# Patient Record
Sex: FEMALE | Race: BLACK OR AFRICAN AMERICAN | NOT HISPANIC OR LATINO | Employment: UNEMPLOYED | URBAN - METROPOLITAN AREA
[De-identification: names, ages, dates, MRNs, and addresses within clinical notes are randomized per-mention and may not be internally consistent; named-entity substitution may affect disease eponyms.]

---

## 2019-12-11 ENCOUNTER — OFFICE VISIT (OUTPATIENT)
Dept: MEDICAL GROUP | Facility: LAB | Age: 54
End: 2019-12-11
Payer: COMMERCIAL

## 2019-12-11 VITALS
TEMPERATURE: 97.3 F | HEIGHT: 67 IN | RESPIRATION RATE: 15 BRPM | HEART RATE: 95 BPM | DIASTOLIC BLOOD PRESSURE: 88 MMHG | SYSTOLIC BLOOD PRESSURE: 148 MMHG | WEIGHT: 207 LBS | BODY MASS INDEX: 32.49 KG/M2 | OXYGEN SATURATION: 95 %

## 2019-12-11 DIAGNOSIS — Z12.39 SCREENING FOR MALIGNANT NEOPLASM OF BREAST: ICD-10-CM

## 2019-12-11 DIAGNOSIS — Z12.11 SCREENING FOR COLORECTAL CANCER: ICD-10-CM

## 2019-12-11 DIAGNOSIS — Z00.00 HEALTHCARE MAINTENANCE: ICD-10-CM

## 2019-12-11 DIAGNOSIS — Z12.12 SCREENING FOR COLORECTAL CANCER: ICD-10-CM

## 2019-12-11 DIAGNOSIS — E66.9 OBESITY (BMI 30-39.9): ICD-10-CM

## 2019-12-11 DIAGNOSIS — Z23 NEED FOR VACCINATION: ICD-10-CM

## 2019-12-11 PROCEDURE — 90472 IMMUNIZATION ADMIN EACH ADD: CPT | Performed by: FAMILY MEDICINE

## 2019-12-11 PROCEDURE — 99386 PREV VISIT NEW AGE 40-64: CPT | Mod: 25 | Performed by: FAMILY MEDICINE

## 2019-12-11 PROCEDURE — 90471 IMMUNIZATION ADMIN: CPT | Performed by: FAMILY MEDICINE

## 2019-12-11 PROCEDURE — 90715 TDAP VACCINE 7 YRS/> IM: CPT | Performed by: FAMILY MEDICINE

## 2019-12-11 PROCEDURE — 90686 IIV4 VACC NO PRSV 0.5 ML IM: CPT | Performed by: FAMILY MEDICINE

## 2019-12-11 ASSESSMENT — ENCOUNTER SYMPTOMS
FEVER: 0
WEIGHT LOSS: 0
PALPITATIONS: 0
CONSTIPATION: 0
BLURRED VISION: 0
CHILLS: 0
SHORTNESS OF BREATH: 0
WHEEZING: 0
NAUSEA: 0
COUGH: 0
ABDOMINAL PAIN: 0
VOMITING: 0
DIARRHEA: 0

## 2019-12-11 ASSESSMENT — PATIENT HEALTH QUESTIONNAIRE - PHQ9
CLINICAL INTERPRETATION OF PHQ2 SCORE: 1
SUM OF ALL RESPONSES TO PHQ QUESTIONS 1-9: 3
5. POOR APPETITE OR OVEREATING: 0 - NOT AT ALL

## 2019-12-11 NOTE — PROGRESS NOTES
"Patt Elizondo is a 53 y.o. female here for   Chief Complaint   Patient presents with   • Establish Care     physcial, prenatative    • Weight Gain       HPI:  Patt is a very pleasant 53 y.o. female.     #Weight gain   -Has gained several pounds (25-30) in the last year.   -Endorses brittle nails, thinning hair. Occasional palpation.   -Patient states that she diet \"isn't the best\" but working on healthy, plant based diet.   -Normal energy level  -working on eating better.     #Health maintenance:  -Cancer screenings: Patient is due for Pap smear, colonoscopy.  -Vaccination: Patient due for zoster, Tdap, influenza  -Currently patient denies any tobacco use, illicit drug use.  Does have occasional alcohol use.  -Patient working on appropriate diet.  Not much exercise occurring.  Will go for occasional walk.  -Sexually active with , feels safe in relationship.    Current medicines (including changes today)  Current Outpatient Medications   Medication Sig Dispense Refill   • Iron-Vitamins (GERITOL COMPLETE PO) Take 1 tablet by mouth every day.       No current facility-administered medications for this visit.      She  has no past medical history on file.  She  has a past surgical history that includes endoscopy; hernia repair (); and cystectomy.  Social History     Tobacco Use   • Smoking status: Never Smoker   • Smokeless tobacco: Never Used   Substance Use Topics   • Alcohol use: Yes     Comment: Socially, OCC    • Drug use: Never     Social History     Patient does not qualify to have social determinant information on file (likely too young).   Social History Narrative   • Not on file     Family History   Problem Relation Age of Onset   • Hypertension Mother    • No Known Problems Father    • Asthma Sister      Family Status   Relation Name Status   • Mo  Alive   • Fa     • Sis  Alive   • Bro  Alive         ROS  Review of Systems   Constitutional: Negative for chills, fever, malaise/fatigue and " "weight loss.   HENT: Negative for hearing loss.    Eyes: Negative for blurred vision.   Respiratory: Negative for cough, shortness of breath and wheezing.    Cardiovascular: Negative for chest pain and palpitations.   Gastrointestinal: Negative for abdominal pain, constipation, diarrhea, nausea and vomiting.   Genitourinary: Negative for dysuria.   Skin: Negative for rash.   All other systems reviewed and are negative.       Objective:     /88 (BP Location: Right arm, Patient Position: Sitting, BP Cuff Size: Adult)   Pulse 95   Temp 36.3 °C (97.3 °F) (Temporal)   Resp 15   Ht 1.696 m (5' 6.78\")   Wt 93.9 kg (207 lb)   SpO2 95%  Body mass index is 32.63 kg/m².  Physical Exam:    Constitutional: Alert, no distress.  Skin: Warm, dry, good turgor, no rashes in visible areas.  Eye: Equal, round and reactive, conjunctiva clear, lids normal.  ENMT: Lips without lesions, good dentition, oropharynx clear. TM's pearly gray with normal light reflexes bilaterally  Neck: Trachea midline, no masses, no thyromegaly. No cervical or supraclavicular lymphadenopathy.  Respiratory: Unlabored respiratory effort, lungs clear to auscultation bilaterally, no wheezes, rales, or ronchi.  Cardiovascular: Normal S1, S2, RRR, no murmur, no edema.  Abdomen: Soft, non-tender, no masses, no hepatosplenomegaly.  Psych: Alert and oriented x3, normal affect and mood.      Assessment and Plan:   The following treatment plan was discussed    1. Healthcare maintenance  -Age-appropriate  on diet, exercise was given.  -We will refer for colon cancer screening.  Schedule appointment for Pap smear at this time.  -Vaccinations given today include Tdap, influenza.  -Follow-up for yearly physical exam.    2. Obesity (BMI 30-39.9)  - REFERRAL TO [facility] HEALTH IMPROVEMENT PROGRAMS (HIP) Services Requested: Weight Management Program; Reason for Visit: Overweight/Obesity; Future  - HEMOGLOBIN A1C; Future  - Lipid Profile; Future  - Basic " Metabolic Panel; Future  - CBC WITHOUT DIFFERENTIAL; Future  - TSH WITH REFLEX TO FT4; Future    3. Need for vaccination  - Influenza Vaccine Quad Injection (PF)  - Tdap Vaccine =>8YO IM    4. Screening for malignant neoplasm of breast  - MA-SCREEN MAMMO W/CAD-BILAT; Future    5. Screening for colorectal cancer  - REFERRAL TO GI FOR COLONOSCOPY      Records requested.  Followup: Return in about 3 months (around 3/11/2020).         This note was created using voice recognition software. I have made every reasonable attempt to correct errors, however, I do anticipate some grammatical errors.

## 2019-12-11 NOTE — PROGRESS NOTES
"Patt Elizondo is a 53 y.o. female here for   Chief Complaint   Patient presents with   • Establish Care     physcial, prenatative    • Weight Gain       HPI:  Patt is a very pleasant 53 y.o. female.         Current medicines (including changes today)  Current Outpatient Medications   Medication Sig Dispense Refill   • Iron-Vitamins (GERITOL COMPLETE PO) Take 1 tablet by mouth every day.       No current facility-administered medications for this visit.      She  has no past medical history on file.  She  has a past surgical history that includes endoscopy; hernia repair (); and cystectomy.  Social History     Tobacco Use   • Smoking status: Never Smoker   • Smokeless tobacco: Never Used   Substance Use Topics   • Alcohol use: Yes     Comment: Socially, OCC    • Drug use: Never     Social History     Patient does not qualify to have social determinant information on file (likely too young).   Social History Narrative   • Not on file     Family History   Problem Relation Age of Onset   • Hypertension Mother    • No Known Problems Father    • Asthma Sister      Family Status   Relation Name Status   • Mo  Alive   • Fa     • Sis  Alive   • Bro  Alive         ROS  ROS     Objective:     /88 (BP Location: Right arm, Patient Position: Sitting, BP Cuff Size: Adult)   Pulse 95   Temp 36.3 °C (97.3 °F) (Temporal)   Resp 15   Ht 1.696 m (5' 6.78\")   Wt 93.9 kg (207 lb)   SpO2 95%  Body mass index is 32.63 kg/m².  Physical Exam:    Constitutional: Alert, no distress.  Skin: Warm, dry, good turgor, no rashes in visible areas.  Eye: Equal, round and reactive, conjunctiva clear, lids normal.  ENMT: Lips without lesions, good dentition, oropharynx clear. TM's pearly gray with normal light reflexes bilaterally  Neck: Trachea midline, no masses, no thyromegaly. No cervical or supraclavicular lymphadenopathy.  Respiratory: Unlabored respiratory effort, lungs clear to auscultation bilaterally, no wheezes, " rales, or ronchi.  Cardiovascular: Normal S1, S2, RRR, no murmur, no edema.  Abdomen: Soft, non-tender, no masses, no hepatosplenomegaly.  Psych: Alert and oriented x3, normal affect and mood.  ***      Assessment and Plan:   The following treatment plan was discussed    There are no diagnoses linked to this encounter.    Records requested.  Followup: No follow-ups on file.         This note was created using voice recognition software. I have made every reasonable attempt to correct errors, however, I do anticipate some grammatical errors.

## 2019-12-20 ENCOUNTER — HOSPITAL ENCOUNTER (OUTPATIENT)
Dept: LAB | Facility: MEDICAL CENTER | Age: 54
End: 2019-12-20
Attending: FAMILY MEDICINE
Payer: COMMERCIAL

## 2019-12-20 ENCOUNTER — TELEPHONE (OUTPATIENT)
Dept: MEDICAL GROUP | Facility: LAB | Age: 54
End: 2019-12-20

## 2019-12-20 DIAGNOSIS — E66.9 OBESITY (BMI 30-39.9): ICD-10-CM

## 2019-12-20 LAB
ANION GAP SERPL CALC-SCNC: 12 MMOL/L (ref 0–11.9)
BUN SERPL-MCNC: 12 MG/DL (ref 8–22)
CALCIUM SERPL-MCNC: 10.1 MG/DL (ref 8.5–10.5)
CHLORIDE SERPL-SCNC: 102 MMOL/L (ref 96–112)
CHOLEST SERPL-MCNC: 258 MG/DL (ref 100–199)
CO2 SERPL-SCNC: 24 MMOL/L (ref 20–33)
CREAT SERPL-MCNC: 0.67 MG/DL (ref 0.5–1.4)
ERYTHROCYTE [DISTWIDTH] IN BLOOD BY AUTOMATED COUNT: 41.5 FL (ref 35.9–50)
EST. AVERAGE GLUCOSE BLD GHB EST-MCNC: 146 MG/DL
GLUCOSE SERPL-MCNC: 122 MG/DL (ref 65–99)
HBA1C MFR BLD: 6.7 % (ref 0–5.6)
HCT VFR BLD AUTO: 45.4 % (ref 37–47)
HDLC SERPL-MCNC: 38 MG/DL
HGB BLD-MCNC: 15.7 G/DL (ref 12–16)
LDLC SERPL CALC-MCNC: 194 MG/DL
MCH RBC QN AUTO: 26.9 PG (ref 27–33)
MCHC RBC AUTO-ENTMCNC: 34.6 G/DL (ref 33.6–35)
MCV RBC AUTO: 77.7 FL (ref 81.4–97.8)
PLATELET # BLD AUTO: 354 K/UL (ref 164–446)
PMV BLD AUTO: 9.9 FL (ref 9–12.9)
POTASSIUM SERPL-SCNC: 3.9 MMOL/L (ref 3.6–5.5)
RBC # BLD AUTO: 5.84 M/UL (ref 4.2–5.4)
SODIUM SERPL-SCNC: 138 MMOL/L (ref 135–145)
TRIGL SERPL-MCNC: 129 MG/DL (ref 0–149)
TSH SERPL DL<=0.005 MIU/L-ACNC: 1.33 UIU/ML (ref 0.38–5.33)
WBC # BLD AUTO: 8.6 K/UL (ref 4.8–10.8)

## 2019-12-20 PROCEDURE — 83036 HEMOGLOBIN GLYCOSYLATED A1C: CPT

## 2019-12-20 PROCEDURE — 80061 LIPID PANEL: CPT

## 2019-12-20 PROCEDURE — 84443 ASSAY THYROID STIM HORMONE: CPT

## 2019-12-20 PROCEDURE — 80048 BASIC METABOLIC PNL TOTAL CA: CPT

## 2019-12-20 PROCEDURE — 85027 COMPLETE CBC AUTOMATED: CPT

## 2019-12-20 PROCEDURE — 36415 COLL VENOUS BLD VENIPUNCTURE: CPT

## 2019-12-20 NOTE — TELEPHONE ENCOUNTER
Call patient to discuss labs.  Patient does have an elevated hemoglobin A1c of 6.7% indicating diagnosis of type 2 diabetes.  Patient will need to come in to discuss further treatment for type 2 diabetes.  Patient had no other questions and agreed with plan.  Patient will plan on seeing me at the beginning of January.

## 2019-12-30 ENCOUNTER — HOSPITAL ENCOUNTER (OUTPATIENT)
Dept: RADIOLOGY | Facility: MEDICAL CENTER | Age: 54
End: 2019-12-30
Attending: FAMILY MEDICINE
Payer: COMMERCIAL

## 2019-12-30 DIAGNOSIS — Z12.39 SCREENING FOR MALIGNANT NEOPLASM OF BREAST: ICD-10-CM

## 2019-12-30 PROCEDURE — 77067 SCR MAMMO BI INCL CAD: CPT

## 2020-01-02 ENCOUNTER — HOSPITAL ENCOUNTER (OUTPATIENT)
Facility: MEDICAL CENTER | Age: 55
End: 2020-01-02
Attending: FAMILY MEDICINE
Payer: COMMERCIAL

## 2020-01-02 ENCOUNTER — OFFICE VISIT (OUTPATIENT)
Dept: MEDICAL GROUP | Facility: LAB | Age: 55
End: 2020-01-02
Payer: COMMERCIAL

## 2020-01-02 VITALS
HEIGHT: 66 IN | HEART RATE: 104 BPM | SYSTOLIC BLOOD PRESSURE: 158 MMHG | OXYGEN SATURATION: 95 % | WEIGHT: 211 LBS | DIASTOLIC BLOOD PRESSURE: 82 MMHG | BODY MASS INDEX: 33.91 KG/M2 | TEMPERATURE: 98.1 F

## 2020-01-02 DIAGNOSIS — E11.9 TYPE 2 DIABETES MELLITUS WITHOUT COMPLICATION, WITHOUT LONG-TERM CURRENT USE OF INSULIN (HCC): ICD-10-CM

## 2020-01-02 DIAGNOSIS — E78.2 MIXED HYPERLIPIDEMIA: ICD-10-CM

## 2020-01-02 DIAGNOSIS — E66.9 OBESITY (BMI 30-39.9): ICD-10-CM

## 2020-01-02 PROCEDURE — 99214 OFFICE O/P EST MOD 30 MIN: CPT | Performed by: FAMILY MEDICINE

## 2020-01-02 PROCEDURE — 82570 ASSAY OF URINE CREATININE: CPT

## 2020-01-02 PROCEDURE — 82043 UR ALBUMIN QUANTITATIVE: CPT

## 2020-01-02 RX ORDER — ATORVASTATIN CALCIUM 20 MG/1
20 TABLET, FILM COATED ORAL EVERY EVENING
Qty: 90 TAB | Refills: 1 | Status: SHIPPED | OUTPATIENT
Start: 2020-01-02 | End: 2020-06-15

## 2020-01-02 ASSESSMENT — ENCOUNTER SYMPTOMS
PALPITATIONS: 0
WHEEZING: 0
VOMITING: 0
FEVER: 0
DOUBLE VISION: 0
ABDOMINAL PAIN: 0
SHORTNESS OF BREATH: 0
NAUSEA: 0
DIARRHEA: 0
CHILLS: 0
BLURRED VISION: 0

## 2020-01-02 ASSESSMENT — PATIENT HEALTH QUESTIONNAIRE - PHQ9: CLINICAL INTERPRETATION OF PHQ2 SCORE: 0

## 2020-01-02 NOTE — PROGRESS NOTES
"Subjective:   Patt Elizondo is a 54 y.o. female here today for   Chief Complaint   Patient presents with   • Lab Results       #DM2   -Patient here to follow-up with labs which show an A1c of 6.7%, new diagnosis of type 2 diabetes.  -Patient is never had previous diagnosis.  -Today she states that she is feeling well, slightly anxious regarding her results.  -Denies any increasing fatigue, polydipsia, polyuria, numbness/tingling/pain in lower extremities, changes to vision.  -No modifying factors.     #Hyperlipid   -Here to review labs, which show elevated cholesterol, LDL.      No Known Allergies      Current medicines (including changes today)  Current Outpatient Medications   Medication Sig Dispense Refill   • metFORMIN (GLUCOPHAGE) 500 MG Tab Take 1 Tab by mouth every day for 14 days, THEN 1 Tab 2 times a day for 14 days, THEN 2 Tabs 2 times a day for 30 days. 162 Tab 0   • atorvastatin (LIPITOR) 20 MG Tab Take 1 Tab by mouth every evening. 90 Tab 1   • Iron-Vitamins (GERITOL COMPLETE PO) Take 1 tablet by mouth every day.       No current facility-administered medications for this visit.      She  has no past medical history on file.    ROS   Review of Systems   Constitutional: Negative for chills and fever.   HENT: Negative for hearing loss.    Eyes: Negative for blurred vision and double vision.   Respiratory: Negative for shortness of breath and wheezing.    Cardiovascular: Negative for chest pain and palpitations.   Gastrointestinal: Negative for abdominal pain, diarrhea, nausea and vomiting.   Skin: Negative for rash.   All other systems reviewed and are negative.         Objective:     Physical Exam:  /82 (BP Location: Right arm, Patient Position: Sitting, BP Cuff Size: Adult)   Pulse (!) 104   Temp 36.7 °C (98.1 °F) (Temporal)   Ht 1.676 m (5' 6\")   Wt 95.7 kg (211 lb)   SpO2 95%  Body mass index is 34.06 kg/m².   Constitutional: Alert, no distress.  Skin: Warm, dry, good turgor, no " rashes in visible areas.  Eye: Equal, round and reactive, conjunctiva clear, lids normal.  ENMT: TM's clear bilaterally, lips without lesions, good dentition, oropharynx clear.  Neck: Trachea midline, no masses, no thyromegaly. No cervical or supraclavicular lymphadenopathy.  Respiratory: Unlabored respiratory effort, lungs clear to auscultation, no wheezes, no rhonchi.  Cardiovascular: Normal S1, S2, no murmur, no edema.  Abdomen: Soft, non-tender, no masses, no hepatosplenomegaly.  Psych: Alert and oriented x3, normal affect and mood.    Assessment and Plan:     1. Type 2 diabetes mellitus without complication, without long-term current use of insulin (HCC)  -This is a new diagnosis after previous A1c elevated at 6.7%.  While A1c is below 7% at this time, given patient's history of hyperlipidemia as well as obesity place the patient in metabolic syndrome category.  Given these findings I do believe patient would do well on metformin.  We will begin with medication at this time.  -Physical examination was unremarkable.  We will get urine for protein creatinine ratio at this time.  We will do foot check, discussed retinal exam at next appointment on February 28.  -We will also place patient on atorvastatin given history of diabetes as well as hyperlipidemia.  -Extensive time was taken to discuss appropriate diet, exercise in regards to type 2 diabetes.  We will also refer patient to nutritional services at this time.  - MICROALBUMIN CREAT RATIO URINE; Future  - metFORMIN (GLUCOPHAGE) 500 MG Tab; Take 1 Tab by mouth every day for 14 days, THEN 1 Tab 2 times a day for 14 days, THEN 2 Tabs 2 times a day for 30 days.  Dispense: 162 Tab; Refill: 0  - atorvastatin (LIPITOR) 20 MG Tab; Take 1 Tab by mouth every evening.  Dispense: 90 Tab; Refill: 1  - REFERRAL TO NUTRITION SERVICES    2. Mixed hyperlipidemia  The 10-year ASCVD risk score (Rush CARRIE Jr., et al., 2013) is: 24.7%   -We will start statin at this time, discussed  appropriate diet including a low-fat/cholesterol diet, plenty of fiber.  - atorvastatin (LIPITOR) 20 MG Tab; Take 1 Tab by mouth every evening.  Dispense: 90 Tab; Refill: 1    3. Obesity (BMI 30-39.9)  - REFERRAL TO NUTRITION SERVICES      Followup: Return in about 2 months (around 3/2/2020).         PLEASE NOTE: This dictation was created using voice recognition software. I have made every reasonable attempt to correct obvious errors, but I expect that there are errors of grammar and possibly content that I did not discover before finalizing the note.

## 2020-01-03 LAB
CREAT UR-MCNC: 75.9 MG/DL
MICROALBUMIN UR-MCNC: <0.7 MG/DL
MICROALBUMIN/CREAT UR: NORMAL MG/G (ref 0–30)

## 2020-01-03 NOTE — RESULT ENCOUNTER NOTE
Rito Beltre,      Here are your lab results. They showed no concerns for kidney damage. Continue all medications that we discussed and I will see you again in Feb. . If you have any other questions please feel free to contact me.     -Delio Townsend M.D.   no

## 2020-01-08 ENCOUNTER — TELEPHONE (OUTPATIENT)
Dept: RADIOLOGY | Facility: MEDICAL CENTER | Age: 55
End: 2020-01-08

## 2020-01-08 ENCOUNTER — HOSPITAL ENCOUNTER (OUTPATIENT)
Dept: RADIOLOGY | Facility: MEDICAL CENTER | Age: 55
End: 2020-01-08
Attending: FAMILY MEDICINE
Payer: COMMERCIAL

## 2020-01-08 DIAGNOSIS — R92.8 ABNORMAL MAMMOGRAM: ICD-10-CM

## 2020-01-08 PROCEDURE — 77066 DX MAMMO INCL CAD BI: CPT

## 2020-01-08 PROCEDURE — 76642 ULTRASOUND BREAST LIMITED: CPT | Mod: RT

## 2020-01-08 PROCEDURE — 77065 DX MAMMO INCL CAD UNI: CPT | Mod: LT

## 2020-01-09 ENCOUNTER — HOSPITAL ENCOUNTER (OUTPATIENT)
Dept: RADIOLOGY | Facility: MEDICAL CENTER | Age: 55
End: 2020-01-09
Attending: FAMILY MEDICINE
Payer: COMMERCIAL

## 2020-01-09 ENCOUNTER — TELEPHONE (OUTPATIENT)
Dept: RADIOLOGY | Facility: MEDICAL CENTER | Age: 55
End: 2020-01-09

## 2020-01-09 DIAGNOSIS — R92.8 ABNORMAL FINDING ON BREAST IMAGING: ICD-10-CM

## 2020-01-09 LAB — PATHOLOGY CONSULT NOTE: NORMAL

## 2020-01-09 PROCEDURE — 88305 TISSUE EXAM BY PATHOLOGIST: CPT

## 2020-01-09 PROCEDURE — 19083 BX BREAST 1ST LESION US IMAG: CPT

## 2020-01-10 ENCOUNTER — TELEPHONE (OUTPATIENT)
Dept: RADIOLOGY | Facility: MEDICAL CENTER | Age: 55
End: 2020-01-10

## 2020-01-22 ENCOUNTER — NON-PROVIDER VISIT (OUTPATIENT)
Dept: HEALTH INFORMATION MANAGEMENT | Facility: MEDICAL CENTER | Age: 55
End: 2020-01-22
Payer: COMMERCIAL

## 2020-01-22 DIAGNOSIS — E66.9 OBESITY (BMI 30-39.9): ICD-10-CM

## 2020-01-22 DIAGNOSIS — E11.9 TYPE 2 DIABETES MELLITUS WITHOUT COMPLICATION, WITHOUT LONG-TERM CURRENT USE OF INSULIN (HCC): ICD-10-CM

## 2020-01-22 PROCEDURE — 97802 MEDICAL NUTRITION INDIV IN: CPT | Performed by: DIETITIAN, REGISTERED

## 2020-01-27 VITALS — BODY MASS INDEX: 33.15 KG/M2 | WEIGHT: 205.4 LBS

## 2020-01-27 NOTE — PROGRESS NOTES
1/27/2020    Delio Townsend M.D.  54 y.o.   Time in/out: 4:00-4:58pm    Anthropometrics/Objective  Vitals:    01/27/20 0903   Weight: 93.2 kg (205 lb 6.4 oz)       Body mass index is 33.15 kg/m².    Stated Goal Weight: 165-170 lbs  Estimated Caloric needs 1548 Kcal (MSJ)   See comprehensive patient history form for further information     Subjective:  - new DM dx  - has gained weight since moving to [place]  - no longer as active as she used to be   - likes to snack  - drinks sweetened beverages  - likes to use butter and coconut oil     Nutrition Diagnosis (PES Statement)  Problem (Nutrition diagnosis)  Clinical: Overweight/Obese/Morbidly Obese/Underweight    Etiology(Addresses the cause,contributing factors)  Excessive energy intake and Inadequate physical activity/exercise    Signs/Symptoms (Address observations and stated info: subjective and objective data)  Reports of observations of high caloric density or large portions of food/beverages and Client history: Condition(s) associated with a diagnosis or treatment: obesity    Client history:  Condition(s) associated with a diagnosis or treatment (specify) DM 2, obesity, HLD    Biochemical data, medical test and procedures  Lab Results   Component Value Date/Time    HBA1C 6.7 (H) 12/20/2019 10:58 AM   @  No results found for: POCGLUCOSE  Lab Results   Component Value Date/Time    CHOLSTRLTOT 258 (H) 12/20/2019 10:58 AM     (H) 12/20/2019 10:58 AM    HDL 38 (A) 12/20/2019 10:58 AM    TRIGLYCERIDE 129 12/20/2019 10:58 AM         Nutrition Intervention    Meal and Snack  Recommend a carb conscious diet     Comprehensive Nutrition education Instruction or training leading to in-depth nutrition related knowledge about:  Benefits to following meal plan, Combine carb, protein and fat at each meal, Meal timing and spacing, Menu Planning, Physical activity/exercise, Portion control, Decreasing PO intake and Handouts provided regarding topics  discussed    Monitoring & Evaluation Plan    Behavioral-Environmental:  Food/Nutrition knowledge: where foods go on our palte    Food / Nutrient Intake:  Food intake: using portions from plate method    Physical Signs / Symptoms:  BG: fasting/ 2 hr pp: WNL, Lipid profiles WNL and Weight change loss of 1-2 lbs/week    Assessment Notes:  Esthela has recently been diagnosed with DM2. She states her weight has been increasing since she moved to the [place] [place] [facility]\"" as she does not have as many active  extracurricular activities that she takes part of. She states she does not have much of a family history of preventable disease as her mother was only recently diagnosed with hypertension. This realization is a large motivator for her health. 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 This has also led to her weight gain that she feels uncomfortable with. Going through recall we were able to identify high saturated fat food options and intake of sweetened beverages that may be contributing to recent diagnoses.   We discussed Nutrition basics, the Plate method and portion sizes for our food groups, snack and meal ideas handouts as well as what carb servings may look like from handout. Recommended Esthela have 2-3 CHO servings per meal and 0-1 serving for snacks. Would be of benefit to go over nutrition label handout at next visit.     Goals:  1. Limit nut servings to 1x a day  2. Use portions from the plate for macronutrients  3. 2-3 CHO servings from list for now per meal    F/U: 4-6 weeks

## 2020-02-19 ENCOUNTER — NON-PROVIDER VISIT (OUTPATIENT)
Dept: HEALTH INFORMATION MANAGEMENT | Facility: MEDICAL CENTER | Age: 55
End: 2020-02-19
Payer: COMMERCIAL

## 2020-02-19 VITALS — BODY MASS INDEX: 32.65 KG/M2 | WEIGHT: 202.3 LBS

## 2020-02-19 DIAGNOSIS — E66.9 OBESITY (BMI 30-39.9): ICD-10-CM

## 2020-02-19 DIAGNOSIS — E11.9 TYPE 2 DIABETES MELLITUS WITHOUT COMPLICATION, WITHOUT LONG-TERM CURRENT USE OF INSULIN (HCC): ICD-10-CM

## 2020-02-19 PROCEDURE — 97803 MED NUTRITION INDIV SUBSEQ: CPT | Performed by: DIETITIAN, REGISTERED

## 2020-02-20 NOTE — PROGRESS NOTES
Nutrition Reassess    2/19/2020    Delio Townsend M.D.   54 y.o.   Time: in/out 4:00-4:42    Subjective:  - has been more mindful  - usually follows the plate with small variance  - has been looking at labels  - trying to find lower carb alternatives to fall into CHO 'bank' for meals  - finds meal planning difficult  - finds themselves to eat more flexitarian food options   - prefers plant based burgers and fish/poultry for their animal proteins  - accompanied by her son, Troy  - has decreased butter servings and started using more heart healthy oils    Anthropometrics/Objective    Vitals:    02/19/20 1649   Weight: 91.8 kg (202 lb 4.8 oz)     Body mass index is 32.65 kg/m².    Previous weight/date: 205.4 lbs Change : -3.1 lbs       Stated Goal Weight: 165-170 lbs      ReAssesment/Notes:   Esthela has been working towards her health goals of losing weight and gaining better control of her blood sugars. Her and her son state they have been reading labels for their food products as a new norm. They are conscious of the carb content and protein for many of their foods and have shifted to finding whole grain options for foods such as pastas as well. Reminded them that the rule of more protein than total carb is for yogurts specifically but it is great they are paying attention to otherwise high carb items and trying to get the most out of our 'carb bank' for each of our meals.  Esthela and Troy state they have been focusing on more of a plant based diet with land animal proteins used sparingly. We discussed how plant based does not have to be plant exclusive. They enjoy fish regularly and we discussed how this is a great protein choice for overall health. They prefer veggie burgers to regular for taste and health reasons they state. Let them know this can be a great choice though they can also be higher in carb so knowing where all of our carb sources are from the meal will be helpful.   Esthela states they  have a tough time choosing meals and meal planning. We discussed writing down our meals for the week so we know what to expect and can plan accordingly. Provided meal calendar handout to help plan foods as well as grocery list. Discussed jacquie ideas to help find healthy meal ideas.     Goals:  1. Looking at different yogurt options that still follow rule  2. Keep up with portion sizes  3. Use [employer] jacquie for good meal ideas    Follow-up: 4-6 weeks

## 2020-02-28 ENCOUNTER — OFFICE VISIT (OUTPATIENT)
Dept: MEDICAL GROUP | Facility: LAB | Age: 55
End: 2020-02-28
Payer: COMMERCIAL

## 2020-02-28 ENCOUNTER — HOSPITAL ENCOUNTER (OUTPATIENT)
Facility: MEDICAL CENTER | Age: 55
End: 2020-02-28
Attending: FAMILY MEDICINE
Payer: COMMERCIAL

## 2020-02-28 VITALS
BODY MASS INDEX: 31.82 KG/M2 | HEART RATE: 98 BPM | RESPIRATION RATE: 12 BRPM | OXYGEN SATURATION: 95 % | SYSTOLIC BLOOD PRESSURE: 118 MMHG | HEIGHT: 66 IN | TEMPERATURE: 98.6 F | WEIGHT: 198 LBS | DIASTOLIC BLOOD PRESSURE: 82 MMHG

## 2020-02-28 DIAGNOSIS — Z00.00 PREVENTATIVE HEALTH CARE: ICD-10-CM

## 2020-02-28 DIAGNOSIS — Z12.4 SCREENING FOR CERVICAL CANCER: ICD-10-CM

## 2020-02-28 DIAGNOSIS — Z23 NEED FOR VACCINATION: ICD-10-CM

## 2020-02-28 PROCEDURE — 90471 IMMUNIZATION ADMIN: CPT | Performed by: FAMILY MEDICINE

## 2020-02-28 PROCEDURE — 90472 IMMUNIZATION ADMIN EACH ADD: CPT | Performed by: FAMILY MEDICINE

## 2020-02-28 PROCEDURE — 99396 PREV VISIT EST AGE 40-64: CPT | Mod: 25 | Performed by: FAMILY MEDICINE

## 2020-02-28 PROCEDURE — 87624 HPV HI-RISK TYP POOLED RSLT: CPT

## 2020-02-28 PROCEDURE — 90732 PPSV23 VACC 2 YRS+ SUBQ/IM: CPT | Performed by: FAMILY MEDICINE

## 2020-02-28 PROCEDURE — 88175 CYTOPATH C/V AUTO FLUID REDO: CPT

## 2020-02-28 PROCEDURE — 90746 HEPB VACCINE 3 DOSE ADULT IM: CPT | Performed by: FAMILY MEDICINE

## 2020-02-28 ASSESSMENT — ENCOUNTER SYMPTOMS
WHEEZING: 0
BLURRED VISION: 0
FEVER: 0
DOUBLE VISION: 0
SHORTNESS OF BREATH: 0
CHILLS: 0

## 2020-02-28 NOTE — PROGRESS NOTES
"Subjective:   Patt Elizondo is a 54 y.o. female here today for   Chief Complaint   Patient presents with   • Gynecologic Exam     Pap smear       #Preventive gynecologic exam:  -Patient here for Pap smear.  She states that everything is going well.  Denies any increased vaginal bleeding, discharge, pain.  She does state that she is going through menopause, only having about a period every year.  -Mammogram was completed beginning of January.  Suspicious findings like the biopsy with a diagnosis fibroadenoma.  They recommend follow-up ultrasound in 6 months.  -No other questions or concerns for patient at this time.  No Known Allergies      Current medicines (including changes today)  Current Outpatient Medications   Medication Sig Dispense Refill   • metFORMIN (GLUCOPHAGE) 500 MG Tab Take 2 Tabs by mouth 2 times a day for 360 days. Take With Meals 360 Tab 3   • atorvastatin (LIPITOR) 20 MG Tab Take 1 Tab by mouth every evening. 90 Tab 1   • Iron-Vitamins (GERITOL COMPLETE PO) Take 1 tablet by mouth every day.       No current facility-administered medications for this visit.      She  has no past medical history on file.    ROS   Review of Systems   Constitutional: Negative for chills and fever.   Eyes: Negative for blurred vision and double vision.   Respiratory: Negative for shortness of breath and wheezing.    Skin: Negative for rash.   All other systems reviewed and are negative.     Objective:     Physical Exam:  /82 (BP Location: Right arm, Patient Position: Sitting, BP Cuff Size: Adult)   Pulse 98   Temp 37 °C (98.6 °F) (Temporal)   Resp 12   Ht 1.676 m (5' 5.98\")   Wt 89.8 kg (198 lb)   SpO2 95%  Body mass index is 31.97 kg/m².   Constitutional: Alert, no distress.  Skin: Warm, dry, good turgor, no rashes in visible areas.  Respiratory: Unlabored respiratory effort, lungs clear to auscultation, no wheezes, no rhonchi.  Cardiovascular: Normal S1, S2, no murmur, no edema.  Abdomen: Soft, " non-tender, no masses, no hepatosplenomegaly.  :Perineum and external genitalia normal without rash. Vagina with scant discharge. Cervix without visible lesions or discharge. Bimanual exam without adnexal masses or cervical motion tenderness.  Psych: Alert and oriented x3, normal affect and mood.    Assessment and Plan:     1. Preventative health care  -Gynecological Pan completed, Pap smear completed with see below).  -All questions and concerns were answered at this time.  -Reviewed all screenings needed.  Diabetes monofilament, retinal screening needed for diabetes which she will follow-up with me at a later time.  -Labs as indicated  -Follow-up for yearly physical exam, barring any abnormalities, follow-up for Pap smear every 3 to 5 years.    2. Screening for cervical cancer  - THINPREP PAP WITH HPV; Future    3. Need for vaccination  - Pneumococal Polysaccharide Vaccine 23-Valent =>3YO SQ/IM  - Hepatitis B Vaccine Adult IM      Followup: Return if symptoms worsen or fail to improve.         PLEASE NOTE: This dictation was created using voice recognition software. I have made every reasonable attempt to correct obvious errors, but I expect that there are errors of grammar and possibly content that I did not discover before finalizing the note.

## 2020-02-29 DIAGNOSIS — Z12.4 SCREENING FOR CERVICAL CANCER: ICD-10-CM

## 2020-02-29 PROCEDURE — 88175 CYTOPATH C/V AUTO FLUID REDO: CPT

## 2020-03-03 LAB
CYTOLOGY REG CYTOL: NORMAL
HPV HR 12 DNA CVX QL NAA+PROBE: NEGATIVE
HPV16 DNA SPEC QL NAA+PROBE: NEGATIVE
HPV18 DNA SPEC QL NAA+PROBE: NEGATIVE
SPECIMEN SOURCE: NORMAL

## 2020-03-18 ENCOUNTER — NON-PROVIDER VISIT (OUTPATIENT)
Dept: HEALTH INFORMATION MANAGEMENT | Facility: MEDICAL CENTER | Age: 55
End: 2020-03-18
Payer: COMMERCIAL

## 2020-03-18 VITALS — WEIGHT: 200.9 LBS | BODY MASS INDEX: 32.44 KG/M2

## 2020-03-18 DIAGNOSIS — E66.9 OBESITY (BMI 30-39.9): ICD-10-CM

## 2020-03-18 DIAGNOSIS — E78.2 MIXED HYPERLIPIDEMIA: ICD-10-CM

## 2020-03-18 DIAGNOSIS — E11.9 TYPE 2 DIABETES MELLITUS WITHOUT COMPLICATION, WITHOUT LONG-TERM CURRENT USE OF INSULIN (HCC): ICD-10-CM

## 2020-03-18 PROCEDURE — 97803 MED NUTRITION INDIV SUBSEQ: CPT | Performed by: DIETITIAN, REGISTERED

## 2020-03-18 NOTE — PROGRESS NOTES
Nutrition Reassess    3/18/2020    Delio Townsend M.D.   54 y.o.   Time: in/out: 4:00-4:31pm      Subjective:  - has been following plate method  - accompanied by her son, Troy  - wondering about workout ideas  - cholesterol levels are one of her main motivating factors    Anthropometrics/Objective    Vitals:    03/18/20 1637   Weight: 91.1 kg (200 lb 14.4 oz)     Body mass index is 32.44 kg/m².      Starting weight: 205.4 lbs  Previous weight/date: 202.3 lbs   Change : -4.5 lbs total         Stated Goal Weight: 165-170 lbs      ReAssesment/Notes:    Patt has been working on her health goals. She finds that she has been thinking of the plate method for her meals ideas however through review we were able to identify more carb items than fit on the plate. We discussed fiber at this visit and what the benefits of soluble and insoluble fibers are in terms of metabolization. We reviewed specific food questions and alternatives or options for them such as yogurts and proteins. A family member is considering keto diet for weight loss and we discussed the pros and cons of this diet as well.  Patt is not interested in the diet as her cholesterol levels are one of her largest motivating factors and feels the high fat component will be counterintuitive.   Recommended Patt focus on her current habits and her carb intake in total to help her regulate her blood sugars as well as impact on cholesterol labs.    Goals:  1. Look at the 'diabetes: meals by the plate' book and e-meals for meal ideas   2. Check out [employer] jacquie for workout ideas  3. Update me on how keto is going, Troy!    Follow-up: 4-6 weeks

## 2020-04-22 ENCOUNTER — NON-PROVIDER VISIT (OUTPATIENT)
Dept: HEALTH INFORMATION MANAGEMENT | Facility: MEDICAL CENTER | Age: 55
End: 2020-04-22
Payer: COMMERCIAL

## 2020-04-22 DIAGNOSIS — E78.2 MIXED HYPERLIPIDEMIA: ICD-10-CM

## 2020-04-22 DIAGNOSIS — E66.9 OBESITY (BMI 30-39.9): ICD-10-CM

## 2020-04-22 DIAGNOSIS — E11.9 TYPE 2 DIABETES MELLITUS WITHOUT COMPLICATION, WITHOUT LONG-TERM CURRENT USE OF INSULIN (HCC): ICD-10-CM

## 2020-04-22 PROCEDURE — 97803 MED NUTRITION INDIV SUBSEQ: CPT | Performed by: DIETITIAN, REGISTERED

## 2020-04-22 NOTE — PROGRESS NOTES
This encounter was conducted via phone call.   Verbal consent was obtained. Patient's identity was verified.      Nutrition Reassess    4/22/2020    Delio Townsend M.D.   54 y.o.   Time: in/out 3:55-4:13      Subjective:  - has been drinking more water  - has been more mindful of her plate, melina with carbs  - has been doing school from home  - has been walking or exercising at home as the gyms are closed   - has been losing weight  - has been using book ideas  -194.6 lbs  - is her son's birthday tomorrow    Anthropometrics/Objective    There were no vitals filed for this visit.  There is no height or weight on file to calculate BMI.       Diet Recall  Time   :B  Toast, veggie burger, shoots, side salad soemtimes some juice   :S  Nuts ( portioning more), yogurt or rice cakes, cheese and crackers   :D  Beef, chicken, mostly fish with quinoa couscous, greens eggplant okra    ReAssesment/Notes:    Patt has been working towards her health goals and doing well. She finds that while her exercise routine has been disrupted due to COVID-19 closures, it has not affected her eating habits poorly which is great. Her son, Troy, has started Keto about a week ago. She states he does his own cooking as she is not interested in those food habits at this time. She has been walking or doing at home exercises for the time being however. Neither Patt nor Troy had any specific questions at this time. Encouraged them to reach out if they had any questions.     Follow-up: 4-6 weeks

## 2020-06-10 ENCOUNTER — NON-PROVIDER VISIT (OUTPATIENT)
Dept: HEALTH INFORMATION MANAGEMENT | Facility: MEDICAL CENTER | Age: 55
End: 2020-06-10
Payer: COMMERCIAL

## 2020-06-10 VITALS — WEIGHT: 187 LBS | BODY MASS INDEX: 30.2 KG/M2

## 2020-06-10 DIAGNOSIS — E78.2 MIXED HYPERLIPIDEMIA: ICD-10-CM

## 2020-06-10 DIAGNOSIS — E11.9 TYPE 2 DIABETES MELLITUS WITHOUT COMPLICATION, WITHOUT LONG-TERM CURRENT USE OF INSULIN (HCC): ICD-10-CM

## 2020-06-10 DIAGNOSIS — E66.9 OBESITY (BMI 30-39.9): ICD-10-CM

## 2020-06-10 PROCEDURE — 98968 PH1 ASSMT&MGMT NQHP 21-30: CPT | Mod: CR | Performed by: DIETITIAN, REGISTERED

## 2020-06-10 NOTE — PROGRESS NOTES
COVID-19 VIRTUAL VISIT   This encounter was conducted via telephone call.   Patient initiated/verbally consented to telephone visit. Patient's identity was verified.  Call start time: 3:31; Call end time: 3:56      Nutrition Reassess    6/10/2020    Delio Townsend M.D.   54 y.o.   Time: in/out 3:31-3:56      Subjective:  - feels she fell off a little it  - has been eating a lot more rice lately  - would like to get more exercise in    - currently does brisk walking, jumping jacks, jump rope  - is going to make a schedule for a habit tracker   - has been getting a little tired of veggies  - does not check BG levels  - puts a lot of pressure on herself   - 186-189 lbs usually right now  - had been having a Turkmen daily     Anthropometrics/Objective    Starting weight: 205.4 lbs      Vitals 6/10/2020   WEIGHT 187   HEIGHT    BMI 30.2 kg/m2     Weight change: -18.4lbs      ReAssesment/Notes:    Patt has been working on her health goals of BG control and weight loss. She has been doing well but feels she 'fell off a little' because she has been having rice more often than she typically does. Let her know that as long as she is balancing her plate, she is doing great! We discussed snack ideas as well. Patt feels she can increase her exercise and will work on this before next visit as well as always pairing a carb item with a protein.    Follow-up: 4-6 weeks

## 2020-06-16 ENCOUNTER — TELEPHONE (OUTPATIENT)
Dept: SCHEDULING | Facility: IMAGING CENTER | Age: 55
End: 2020-06-16

## 2020-06-16 DIAGNOSIS — E11.9 TYPE 2 DIABETES MELLITUS WITHOUT COMPLICATION, WITHOUT LONG-TERM CURRENT USE OF INSULIN (HCC): ICD-10-CM

## 2020-06-22 ENCOUNTER — HOSPITAL ENCOUNTER (OUTPATIENT)
Dept: LAB | Facility: MEDICAL CENTER | Age: 55
End: 2020-06-22
Attending: FAMILY MEDICINE
Payer: COMMERCIAL

## 2020-06-22 DIAGNOSIS — E11.9 TYPE 2 DIABETES MELLITUS WITHOUT COMPLICATION, WITHOUT LONG-TERM CURRENT USE OF INSULIN (HCC): ICD-10-CM

## 2020-06-22 LAB
EST. AVERAGE GLUCOSE BLD GHB EST-MCNC: 114 MG/DL
HBA1C MFR BLD: 5.6 % (ref 0–5.6)

## 2020-06-22 PROCEDURE — 36415 COLL VENOUS BLD VENIPUNCTURE: CPT

## 2020-06-22 PROCEDURE — 83036 HEMOGLOBIN GLYCOSYLATED A1C: CPT

## 2020-06-30 ENCOUNTER — PATIENT MESSAGE (OUTPATIENT)
Dept: MEDICAL GROUP | Facility: LAB | Age: 55
End: 2020-06-30

## 2020-06-30 DIAGNOSIS — R92.8 ABNORMAL MAMMOGRAM: ICD-10-CM

## 2020-07-13 ENCOUNTER — HOSPITAL ENCOUNTER (OUTPATIENT)
Dept: RADIOLOGY | Facility: MEDICAL CENTER | Age: 55
End: 2020-07-13
Attending: NURSE PRACTITIONER
Payer: COMMERCIAL

## 2020-07-13 DIAGNOSIS — R92.8 ABNORMAL MAMMOGRAM: ICD-10-CM

## 2020-07-13 PROCEDURE — 76642 ULTRASOUND BREAST LIMITED: CPT | Mod: RT

## 2020-07-15 ENCOUNTER — NON-PROVIDER VISIT (OUTPATIENT)
Dept: HEALTH INFORMATION MANAGEMENT | Facility: MEDICAL CENTER | Age: 55
End: 2020-07-15
Payer: COMMERCIAL

## 2020-07-15 DIAGNOSIS — E66.9 OBESITY (BMI 30-39.9): ICD-10-CM

## 2020-07-15 DIAGNOSIS — E78.2 MIXED HYPERLIPIDEMIA: ICD-10-CM

## 2020-07-15 DIAGNOSIS — E11.9 TYPE 2 DIABETES MELLITUS WITHOUT COMPLICATION, WITHOUT LONG-TERM CURRENT USE OF INSULIN (HCC): ICD-10-CM

## 2020-07-15 PROCEDURE — 98968 PH1 ASSMT&MGMT NQHP 21-30: CPT | Mod: CR | Performed by: DIETITIAN, REGISTERED

## 2020-07-15 NOTE — PROGRESS NOTES
COVID-19 VIRTUAL VISIT   This encounter was conducted via telephone call.   Patient initiated/verbally consented to telephone visit. Patient's identity was verified.  Call start time: 11:27; Call end time: 11:54    Nutrition Reassess    7/15/2020    Delio Townsend M.D.   54 y.o.     Time In/Out: 11:27-11:54      Subjective:  - brought A1c down from 6.7% to 5.6%!  - has been limiting portions   - wants to know about cheese servings  - states her son Troy has lost 20-30 lbs with keto  - wants to know abotu a modified keto   - has mostly been home   - has been gardening  - has not been ging to gym due to precautions  - has been doing brisk walks aroudn neighborhood   - has been doing schoo lwork  - has been doing jump rope and jacks, dancing, walking  - reaching water goals    ReAssesment/Notes:    Patt has been working on her health goals towards better BG control and weight loss. She has been making great improvements to her diet as evidence by her decline of A1c to 5.6%. She was very pleased with this health change. She has been mindful of her portions and her carb servings at meals and snacks. Pairing carbs with proteins focusing on more healthy food options, less refined foods. Following the plate method for most meals.   Had questions regarding food servings for specific foods, discussed these.   Esthela's main concerns are now her cholesterol levels. No new labs however considering her current intake and increase in activity, would presume better lab values. She plans on getting new labs done in the next few months. Discussed questions regarding different smokepoints and types of oils to use, MCT oil and possible benefits.   Encouraged her to reach out with questions      Follow-up: 3 months

## 2020-10-21 ENCOUNTER — NON-PROVIDER VISIT (OUTPATIENT)
Dept: HEALTH INFORMATION MANAGEMENT | Facility: MEDICAL CENTER | Age: 55
End: 2020-10-21
Payer: COMMERCIAL

## 2020-10-21 VITALS — BODY MASS INDEX: 28.73 KG/M2 | WEIGHT: 177.9 LBS

## 2020-10-21 DIAGNOSIS — E66.9 OBESITY (BMI 30-39.9): ICD-10-CM

## 2020-10-21 PROCEDURE — 97803 MED NUTRITION INDIV SUBSEQ: CPT | Performed by: DIETITIAN, REGISTERED

## 2020-10-21 NOTE — PROGRESS NOTES
Nutrition Reassess    10/21/2020    Delio Townsend M.D.   54 y.o.     Time In/Out: 3:40-4:20      Subjective:  - has brought A1c down to 5.6%!  - working on activity  - finding portions that work  - small details such as sugar/sweetener choices  - accompanied by her son    Anthropometrics/Objective    Vitals 10/21/2020   WEIGHT 177.9   HEIGHT    BMI 28.73 kg/m2          Weight Hx:  200.9 lbs (3/18/20)  187 lbs (6/10/20)  177.9 lbs (10/21/20)    Total weight change: -23 lbs      ReAssesment/Notes:    Patt has been working on her health goals towards weight loss, better BG control, and better cholesterol levels. She has been doing an incredible job with her BG control and weight loss. No new labs for lipids. Applauded her for her efforts and her energy!   Troy and Patt had some specific food questions regarding alternatives for sweeteners and sugars. We reviewed some options for healthier fats as well. Discussed the importance of fiber briefly and provided literature for it as well as the fats handout to help identify healthier fat choices. Recommend reaching out with any questions but they are doing great!     Follow-up: 6 months

## 2020-11-16 ENCOUNTER — GYNECOLOGY VISIT (OUTPATIENT)
Dept: OBGYN | Facility: CLINIC | Age: 55
End: 2020-11-16
Payer: COMMERCIAL

## 2020-11-16 VITALS
BODY MASS INDEX: 28.35 KG/M2 | SYSTOLIC BLOOD PRESSURE: 167 MMHG | DIASTOLIC BLOOD PRESSURE: 115 MMHG | HEIGHT: 66 IN | WEIGHT: 176.4 LBS

## 2020-11-16 DIAGNOSIS — N81.10 PELVIC ORGAN PROLAPSE QUANTIFICATION STAGE 3 CYSTOCELE: Primary | ICD-10-CM

## 2020-11-16 DIAGNOSIS — N85.2 UTERINE ENLARGEMENT: ICD-10-CM

## 2020-11-16 PROCEDURE — 99203 OFFICE O/P NEW LOW 30 MIN: CPT | Performed by: OBSTETRICS & GYNECOLOGY

## 2020-11-16 SDOH — HEALTH STABILITY: MENTAL HEALTH: HOW OFTEN DO YOU HAVE A DRINK CONTAINING ALCOHOL?: MONTHLY OR LESS

## 2020-11-16 NOTE — PROGRESS NOTES
GYN Visit    CC: vaginal bulge    HPI: Patt Elizondo is a 54 y.o.  with vaginal bulge.      Reports she first noticed it a few weeks ago.  Comes to the edge of the outside; not all the way outside.  No pain just worried that it shouldn't be there.      No problems with BM or urination. Denies involuntary loss of urine.   No sex since January but is sexually active with ..      Last period July, no VB since.        ROS:  12 system ROS performed and neg except for vaginal bulge    OB History    Para Term  AB Living   6 5 5   1 3   SAB TAB Ectopic Molar Multiple Live Births   1         4      # Outcome Date GA Lbr Ever/2nd Weight Sex Delivery Anes PTL Lv   6 Term     M Vag-Spont   ALEX   5 Term     M Vag-Spont   FD   4 Term     M Vag-Spont   ND   3 Term     M Vag-Spont   ALEX   2 Term     F Vag-Spont   ALEX   1 SAB                GYN Hx:   Menses previously regular  Denies hx of STIs  Denies hx of abnl paps, last pap 3/2020     Past Medical History:   Diagnosis Date   • Diabetes (HCC)    • Hyperlipidemia        Past Surgical History:   Procedure Laterality Date   • HERNIA REPAIR     • CYSTECTOMY      back    • ENDOSCOPY      Scar tissue found.        Medications:   Current Outpatient Medications Ordered in Epic   Medication Sig Dispense Refill   • atorvastatin (LIPITOR) 20 MG Tab TAKE 1 TABLET BY MOUTH EVERY DAY IN THE EVENING 90 Tab 3   • metFORMIN (GLUCOPHAGE) 500 MG Tab Take 2 Tabs by mouth 2 times a day for 360 days. Take With Meals 360 Tab 3   • Iron-Vitamins (GERITOL COMPLETE PO) Take 1 tablet by mouth every day.       No current [facility]-ordered facility-administered medications on file.        Allergies: Patient has no known allergies.    Social History     Tobacco Use   • Smoking status: Never Smoker   • Smokeless tobacco: Never Used   Substance Use Topics   • Alcohol use: Yes     Frequency: Monthly or less     Comment: Socially, OCC    • Drug use:  "Never         Family History   Problem Relation Age of Onset   • Hypertension Mother    • No Known Problems Father    • Asthma Sister      Denies hx of GI/GYN/breast cancers    Physical Exam:  BP (!) 167/115   Ht 1.676 m (5' 6\")   Wt 80 kg (176 lb 6.4 oz)   BMI 28.47 kg/m²   gen: AAO, NAD, affect appropriate  CV: RRR; no LE edema  resp: ctab  abd: soft, NT, ND, no masses, no hernias  : NEFG, normal urethral meatus, normal anus/perineum, normal vagina and cervix.  Uterus 10-12wk sized, anteverted, mobile, no adnexal masses/tenderness  Skin: warm/dry, no lesions    POP Q:  Aa: +1 Ba: +1 C: -4  Gh: 6 PB:  TVL: 8  Ap: -1 Bp: -1 D: -5    + Urethral hypermobility  No leak with cough    A/P: 54 y.o.  with stage 3 pelvic organ prolapse  1. Pelvic organ prolapse quantification stage 3 cystocele     2. Uterine enlargement  US-PELVIC COMPLETE (TRANSABDOMINAL/TRANSVAGINAL) (COMBO)     Discussed pelvic organ prolapse as a common and often bothersome but not typically medically dangerous diagnosis.  Bladder is leading edge of prolapse although with some apical descent as well and mild relaxation of the posterior vaginal wall.  Uterus does feel slightly large on exam, patient denies a known history of fibroids, will get pelvic ultrasound.  Discussed treatment of pelvic organ prolapse is largely based on symptoms.  Discussed that if is not bothersome to her we do not have to do anything, discussed that pelvic organ prolapse can worsen over time.  Also discussed options including pessary and surgical management.  If patient were to desire surgery, would recommend we consider a laparoscopic hysterectomy, anterior and posterior repair, as well as a vaginal vault suspension would do in tandem with partner who does suspensions.  Currently without urinary incontinence.  Discussed that there is a lifetime recurrence rate of 20 to 30% for women after they have surgery for pelvic organ prolapse.    Patient to consider options, " return to clinic after ultrasound and at that time can either do pessary fitting or further discussion surgical options.    RTC 1 month    Valerie Mahoney MD  [facility], Women's Health

## 2020-11-16 NOTE — NON-PROVIDER
Pt here as New Pt for Gyn exam  Good Phone #:[phone]  Pharmacy verified.  Last Pap:2/29/2020, neg result in Labs.  LMP:7/2020  Pt states would like to establish care.  Pt states would like to discuss her uterine prolapse x 2 wks.

## 2020-11-30 ENCOUNTER — HOSPITAL ENCOUNTER (OUTPATIENT)
Dept: RADIOLOGY | Facility: MEDICAL CENTER | Age: 55
End: 2020-11-30
Attending: OBSTETRICS & GYNECOLOGY
Payer: COMMERCIAL

## 2020-11-30 DIAGNOSIS — N85.2 UTERINE ENLARGEMENT: ICD-10-CM

## 2020-11-30 PROCEDURE — 76830 TRANSVAGINAL US NON-OB: CPT

## 2020-12-16 ENCOUNTER — NON-PROVIDER VISIT (OUTPATIENT)
Dept: MEDICAL GROUP | Facility: LAB | Age: 55
End: 2020-12-16
Payer: COMMERCIAL

## 2020-12-16 DIAGNOSIS — Z23 NEED FOR VACCINATION: ICD-10-CM

## 2020-12-16 DIAGNOSIS — Z11.59 SCREENING FOR VIRAL DISEASE: ICD-10-CM

## 2020-12-16 PROCEDURE — 90686 IIV4 VACC NO PRSV 0.5 ML IM: CPT | Performed by: FAMILY MEDICINE

## 2020-12-16 PROCEDURE — 90471 IMMUNIZATION ADMIN: CPT | Performed by: FAMILY MEDICINE

## 2020-12-16 NOTE — PROGRESS NOTES
"Patt Elizondo is a 54 y.o. female here for a non-provider visit for:   FLU    Reason for immunization: Annual Flu Vaccine  Immunization records indicate need for vaccine: Yes, confirmed with Epic  Minimum interval has been met for this vaccine: Yes  ABN completed: Yes    Order and dose verified by: ar  VIS Dated   was given to patient: Yes  All IAC Questionnaire questions were answered \"No.\"    Patient tolerated injection and no adverse effects were observed or reported: Yes    Pt scheduled for next dose in series: Not Indicated   "

## 2020-12-17 ENCOUNTER — HOSPITAL ENCOUNTER (OUTPATIENT)
Dept: LAB | Facility: MEDICAL CENTER | Age: 55
End: 2020-12-17
Attending: FAMILY MEDICINE
Payer: COMMERCIAL

## 2020-12-17 DIAGNOSIS — Z11.59 SCREENING FOR VIRAL DISEASE: ICD-10-CM

## 2020-12-17 LAB
COVID ORDER STATUS COVID19: NORMAL
SARS-COV-2 RNA RESP QL NAA+PROBE: NOTDETECTED
SPECIMEN SOURCE: NORMAL

## 2020-12-17 PROCEDURE — C9803 HOPD COVID-19 SPEC COLLECT: HCPCS

## 2020-12-17 PROCEDURE — U0003 INFECTIOUS AGENT DETECTION BY NUCLEIC ACID (DNA OR RNA); SEVERE ACUTE RESPIRATORY SYNDROME CORONAVIRUS 2 (SARS-COV-2) (CORONAVIRUS DISEASE [COVID-19]), AMPLIFIED PROBE TECHNIQUE, MAKING USE OF HIGH THROUGHPUT TECHNOLOGIES AS DESCRIBED BY CMS-2020-01-R: HCPCS

## 2021-02-02 DIAGNOSIS — E11.9 TYPE 2 DIABETES MELLITUS WITHOUT COMPLICATION, WITHOUT LONG-TERM CURRENT USE OF INSULIN (HCC): ICD-10-CM

## 2021-02-02 NOTE — TELEPHONE ENCOUNTER
Received request via: Pharmacy    Was the patient seen in the last year in this department? Yes  2/28/20  Does the patient have an active prescription (recently filled or refills available) for medication(s) requested? No

## 2021-02-04 ENCOUNTER — PATIENT MESSAGE (OUTPATIENT)
Dept: MEDICAL GROUP | Facility: LAB | Age: 56
End: 2021-02-04

## 2021-02-04 DIAGNOSIS — E11.9 TYPE 2 DIABETES MELLITUS WITHOUT COMPLICATION, WITHOUT LONG-TERM CURRENT USE OF INSULIN (HCC): ICD-10-CM

## 2021-02-10 ENCOUNTER — HOSPITAL ENCOUNTER (OUTPATIENT)
Dept: LAB | Facility: MEDICAL CENTER | Age: 56
End: 2021-02-10
Attending: FAMILY MEDICINE
Payer: COMMERCIAL

## 2021-02-10 DIAGNOSIS — E11.9 TYPE 2 DIABETES MELLITUS WITHOUT COMPLICATION, WITHOUT LONG-TERM CURRENT USE OF INSULIN (HCC): ICD-10-CM

## 2021-02-10 LAB
ALBUMIN SERPL BCP-MCNC: 4.4 G/DL (ref 3.2–4.9)
ALBUMIN/GLOB SERPL: 1.3 G/DL
ALP SERPL-CCNC: 75 U/L (ref 30–99)
ALT SERPL-CCNC: 12 U/L (ref 2–50)
ANION GAP SERPL CALC-SCNC: 9 MMOL/L (ref 7–16)
AST SERPL-CCNC: 14 U/L (ref 12–45)
BILIRUB SERPL-MCNC: 0.4 MG/DL (ref 0.1–1.5)
BUN SERPL-MCNC: 9 MG/DL (ref 8–22)
CALCIUM SERPL-MCNC: 9.9 MG/DL (ref 8.5–10.5)
CHLORIDE SERPL-SCNC: 101 MMOL/L (ref 96–112)
CHOLEST SERPL-MCNC: 130 MG/DL (ref 100–199)
CO2 SERPL-SCNC: 27 MMOL/L (ref 20–33)
CREAT SERPL-MCNC: 0.55 MG/DL (ref 0.5–1.4)
CREAT UR-MCNC: 54.91 MG/DL
ERYTHROCYTE [DISTWIDTH] IN BLOOD BY AUTOMATED COUNT: 45 FL (ref 35.9–50)
EST. AVERAGE GLUCOSE BLD GHB EST-MCNC: 131 MG/DL
FASTING STATUS PATIENT QL REPORTED: NORMAL
GLOBULIN SER CALC-MCNC: 3.5 G/DL (ref 1.9–3.5)
GLUCOSE SERPL-MCNC: 93 MG/DL (ref 65–99)
HBA1C MFR BLD: 6.2 % (ref 0–5.6)
HCT VFR BLD AUTO: 40.9 % (ref 37–47)
HDLC SERPL-MCNC: 44 MG/DL
HGB BLD-MCNC: 14 G/DL (ref 12–16)
LDLC SERPL CALC-MCNC: 73 MG/DL
MCH RBC QN AUTO: 26.8 PG (ref 27–33)
MCHC RBC AUTO-ENTMCNC: 34.2 G/DL (ref 33.6–35)
MCV RBC AUTO: 78.4 FL (ref 81.4–97.8)
MICROALBUMIN UR-MCNC: <1.2 MG/DL
MICROALBUMIN/CREAT UR: NORMAL MG/G (ref 0–30)
PLATELET # BLD AUTO: 316 K/UL (ref 164–446)
PMV BLD AUTO: 10.6 FL (ref 9–12.9)
POTASSIUM SERPL-SCNC: 3.9 MMOL/L (ref 3.6–5.5)
PROT SERPL-MCNC: 7.9 G/DL (ref 6–8.2)
RBC # BLD AUTO: 5.22 M/UL (ref 4.2–5.4)
SODIUM SERPL-SCNC: 137 MMOL/L (ref 135–145)
TRIGL SERPL-MCNC: 63 MG/DL (ref 0–149)
WBC # BLD AUTO: 6.7 K/UL (ref 4.8–10.8)

## 2021-02-10 PROCEDURE — 80053 COMPREHEN METABOLIC PANEL: CPT

## 2021-02-10 PROCEDURE — 82570 ASSAY OF URINE CREATININE: CPT

## 2021-02-10 PROCEDURE — 82043 UR ALBUMIN QUANTITATIVE: CPT

## 2021-02-10 PROCEDURE — 83036 HEMOGLOBIN GLYCOSYLATED A1C: CPT

## 2021-02-10 PROCEDURE — 36415 COLL VENOUS BLD VENIPUNCTURE: CPT

## 2021-02-10 PROCEDURE — 85027 COMPLETE CBC AUTOMATED: CPT

## 2021-02-10 PROCEDURE — 80061 LIPID PANEL: CPT

## 2021-02-10 NOTE — TELEPHONE ENCOUNTER
Orders are placed. I need both blood and urine. Labs should be fasting. No food for 8 hours before lab draw. Thanks.

## 2021-02-12 ENCOUNTER — OFFICE VISIT (OUTPATIENT)
Dept: MEDICAL GROUP | Facility: LAB | Age: 56
End: 2021-02-12
Payer: COMMERCIAL

## 2021-02-12 VITALS
HEIGHT: 66 IN | BODY MASS INDEX: 28.28 KG/M2 | OXYGEN SATURATION: 98 % | RESPIRATION RATE: 13 BRPM | DIASTOLIC BLOOD PRESSURE: 84 MMHG | HEART RATE: 95 BPM | TEMPERATURE: 97.6 F | SYSTOLIC BLOOD PRESSURE: 142 MMHG | WEIGHT: 176 LBS

## 2021-02-12 DIAGNOSIS — Z23 NEED FOR VACCINATION: ICD-10-CM

## 2021-02-12 DIAGNOSIS — Z00.00 ANNUAL PHYSICAL EXAM: ICD-10-CM

## 2021-02-12 DIAGNOSIS — E11.9 TYPE 2 DIABETES MELLITUS WITHOUT COMPLICATION, WITHOUT LONG-TERM CURRENT USE OF INSULIN (HCC): ICD-10-CM

## 2021-02-12 DIAGNOSIS — E78.2 MIXED HYPERLIPIDEMIA: ICD-10-CM

## 2021-02-12 PROBLEM — E66.3 OVERWEIGHT (BMI 25.0-29.9): Status: ACTIVE | Noted: 2019-12-11

## 2021-02-12 PROCEDURE — 90471 IMMUNIZATION ADMIN: CPT | Performed by: FAMILY MEDICINE

## 2021-02-12 PROCEDURE — 90746 HEPB VACCINE 3 DOSE ADULT IM: CPT | Performed by: FAMILY MEDICINE

## 2021-02-12 PROCEDURE — 99396 PREV VISIT EST AGE 40-64: CPT | Mod: 25 | Performed by: FAMILY MEDICINE

## 2021-02-12 ASSESSMENT — ENCOUNTER SYMPTOMS
PALPITATIONS: 0
BLOOD IN STOOL: 0
BLURRED VISION: 0
SHORTNESS OF BREATH: 0
DIARRHEA: 0
CONSTIPATION: 0
WHEEZING: 0
VOMITING: 0
ABDOMINAL PAIN: 0
FEVER: 0
DOUBLE VISION: 0
CHILLS: 0

## 2021-02-12 ASSESSMENT — FIBROSIS 4 INDEX: FIB4 SCORE: 0.7

## 2021-02-12 ASSESSMENT — PATIENT HEALTH QUESTIONNAIRE - PHQ9: CLINICAL INTERPRETATION OF PHQ2 SCORE: 0

## 2021-02-12 NOTE — PROGRESS NOTES
"Subjective:   Patt Elizondo is a 55 y.o. female here today for   Chief Complaint   Patient presents with   • Annual Exam         ***    No Known Allergies      Current medicines (including changes today)  Current Outpatient Medications   Medication Sig Dispense Refill   • metFORMIN (GLUCOPHAGE) 500 MG Tab TAKE 2 TABS BY MOUTH 2 TIMES A  Tab 3   • atorvastatin (LIPITOR) 20 MG Tab TAKE 1 TABLET BY MOUTH EVERY DAY IN THE EVENING 90 Tab 3   • Iron-Vitamins (GERITOL COMPLETE PO) Take 1 tablet by mouth every day.       No current facility-administered medications for this visit.     She  has a past medical history of Diabetes (HCC) and Hyperlipidemia. She also has no past medical history of Addisons disease (HCC), Adrenal disorder (HCC), Allergy, Anemia, Anxiety, Arrhythmia, Arthritis, Asthma, Blood transfusion without reported diagnosis, Cancer ([facility]), Cataract, CHF (congestive heart failure) ([facility]), Clotting disorder (HCC), COPD (chronic obstructive pulmonary disease) (HCC), Cushings syndrome (HCC), Depression, Diabetic neuropathy (HCC), GERD (gastroesophageal reflux disease), Glaucoma, Goiter, Head ache, Heart attack (HCC), Heart murmur, HIV (human immunodeficiency virus infection) (HCC), Hypertension, IBD (inflammatory bowel disease), Kidney disease, Meningitis, Migraine, Muscle disorder, Osteoporosis, Parathyroid disorder (HCC), Pituitary disease (HCC), Pulmonary emphysema (HCC), Seizure (HCC), Sickle cell disease (HCC), Stroke (HCC), Substance abuse (HCC), Thyroid disease, Tuberculosis, or Urinary tract infection.    ROS   ROS       Objective:     Physical Exam:  /84   Pulse 95   Temp 36.4 °C (97.6 °F) (Temporal)   Resp 13   Ht 1.676 m (5' 6\")   Wt 79.8 kg (176 lb)   SpO2 98%  Body mass index is 28.4 kg/m². ***  Constitutional: Alert, no distress.  Skin: Warm, dry, good turgor, no rashes in visible areas.  Eye: Equal, round and reactive, conjunctiva clear, lids normal.  ENMT: TM's clear " bilaterally, lips without lesions, good dentition, oropharynx clear.  Neck: Trachea midline, no masses, no thyromegaly. No cervical or supraclavicular lymphadenopathy.  Respiratory: Unlabored respiratory effort, lungs clear to auscultation, no wheezes, no rhonchi.  Cardiovascular: Normal S1, S2, no murmur, no edema.  Abdomen: Soft, non-tender, no masses, no hepatosplenomegaly.  Psych: Alert and oriented x3, normal affect and mood.    Assessment and Plan:     1. Need for vaccination  ***  - Hepatitis B Vaccine Adult IM      Followup: Return in about 10 months (around 12/12/2021).         PLEASE NOTE: This dictation was created using voice recognition software. I have made every reasonable attempt to correct obvious errors, but I expect that there are errors of grammar and possibly content that I did not discover before finalizing the note.

## 2021-02-12 NOTE — PROGRESS NOTES
Subjective:   Patt Elizondo is a 55 y.o. female here today for   Chief Complaint   Patient presents with   • Annual Exam     #Annual Exam   -Reviewed all past medical history, family history, social history.  -Reviewed all screening/vaccinations: Due for tetanus B vaccine, shingles vaccine.  Patient declines shingles vaccine at this time.  Otherwise up-to-date on all screenings.  -Diet and Exercise: Clear on improvement of diet and exercise, has undergone 25 pound weight loss this last year.  -Tobacco, alcohol, recreational drug use: Patient alcohol use peer denies any tobacco, recreational drug use.  -Sexually active: N/A    #Type 2 diabetes:  -Patient has been extremely diligent in taking medications and working on diet and exercise.  Has had a 25 pound weight loss this last year and is feeling much better.  While she continues to take Metformin she is very interested in discontinuing all medications at this time.  -Denies any crease fatigue, polydipsia, polyuria, numbness, tingling, pain in her lower extremities.    #Hyperlipidemia:  -Currently treated with atorvastatin 20 mg daily.  Compliant with medications.  Has no concerns regarding side effects.  Is interested in discontinuing medications at this time.        No Known Allergies      Current medicines (including changes today)  Current Outpatient Medications   Medication Sig Dispense Refill   • metFORMIN (GLUCOPHAGE) 500 MG Tab TAKE 2 TABS BY MOUTH 2 TIMES A  Tab 3   • atorvastatin (LIPITOR) 20 MG Tab TAKE 1 TABLET BY MOUTH EVERY DAY IN THE EVENING 90 Tab 3   • Iron-Vitamins (GERITOL COMPLETE PO) Take 1 tablet by mouth every day.       No current facility-administered medications for this visit.     She  has a past medical history of Diabetes (HCC) and Hyperlipidemia. She also has no past medical history of Addisons disease (HCC), Adrenal disorder (HCC), Allergy, Anemia, Anxiety, Arrhythmia, Arthritis, Asthma, Blood transfusion without  "reported diagnosis, Cancer ([facility]), Cataract, CHF (congestive heart failure) (HCC), Clotting disorder (HCC), COPD (chronic obstructive pulmonary disease) (HCC), Cushings syndrome (HCC), Depression, Diabetic neuropathy (HCC), GERD (gastroesophageal reflux disease), Glaucoma, Goiter, Head ache, Heart attack (HCC), Heart murmur, HIV (human immunodeficiency virus infection) (HCC), Hypertension, IBD (inflammatory bowel disease), Kidney disease, Meningitis, Migraine, Muscle disorder, Osteoporosis, Parathyroid disorder (HCC), Pituitary disease (HCC), Pulmonary emphysema (HCC), Seizure (HCC), Sickle cell disease (HCC), Stroke (HCC), Substance abuse (HCC), Thyroid disease, Tuberculosis, or Urinary tract infection.    ROS   Review of Systems   Constitutional: Negative for chills and fever.   HENT: Negative for hearing loss.    Eyes: Negative for blurred vision and double vision.   Respiratory: Negative for shortness of breath and wheezing.    Cardiovascular: Negative for chest pain and palpitations.   Gastrointestinal: Negative for abdominal pain, blood in stool, constipation, diarrhea and vomiting.          Objective:     Physical Exam:  /84   Pulse 95   Temp 36.4 °C (97.6 °F) (Temporal)   Resp 13   Ht 1.676 m (5' 6\")   Wt 79.8 kg (176 lb)   SpO2 98%  Body mass index is 28.4 kg/m².   Constitutional: Alert, no distress.  Skin: Warm, dry, good turgor, no rashes in visible areas.  Eye: Equal, round and reactive, conjunctiva clear, lids normal.  ENMT: TM's clear bilaterally, lips without lesions, good dentition, oropharynx clear.  Neck: Trachea midline, no masses, no thyromegaly. No cervical or supraclavicular lymphadenopathy.  Respiratory: Unlabored respiratory effort, lungs clear to auscultation, no wheezes, no rhonchi.  Cardiovascular: Normal S1, S2, no murmur, no edema.  Abdomen: Soft, non-tender, no masses, no hepatosplenomegaly.  Psych: Alert and oriented x3, normal affect and mood.  Monofilament testing with a " 10 gram force: sensation intact: intact bilaterally  Visual Inspection: Feet without maceration, ulcers, fissures.  Pedal pulses: intact bilaterally      Assessment and Plan:     1. Annual physical exam  -All questions concerns were answered at this time.  -Vaccinations/screenings needed at this time: We will complete hepatitis B vaccine today.  -Labs reviewed, no concerns.  -Discussed the importance of a healthy, Mediterranean style diet, routine exercise regimen.  -Discussed general safety measures including seatbelts, helmets, avoidance of smoking, sunscreen, hydration.  -Follow-up for general physical exam on a yearly basis, sooner if needed.    2. Type 2 diabetes mellitus without complication, without long-term current use of insulin (HCC)  -Status: Stable.  For the last year and a half patient has had hemoglobin A1c is below 6.4%.  She is asymptomatic at this time and will continue to work on proper diet and exercise regimen.  Patient is very interested in discontinuing atorvastatin as well as Metformin.  I discussed this at length with patient.  Typically we wait 2 years for patient to have hemoglobin A1c is below 6.4% before we discussed discontinuing medication.  Patient will follow up with me in December.  If hemoglobin A1c is remained stable with more weight loss then we will discontinue both atorvastatin and Metformin at that time.  Patient understood and agreed to this current treatment plan.  - Diabetic Monofilament LE Exam    3. Mixed hyperlipidemia  -See above.    4. Need for vaccination  Patient was agreeable to receiving the Hep B vaccine in clinic today after discussion of potential risks, benefits, and side effects. Vaccine was administered without adverse effects.  - Hepatitis B Vaccine Adult IM    Followup: Return in about 10 months (around 12/12/2021).         PLEASE NOTE: This dictation was created using voice recognition software. I have made every reasonable attempt to correct obvious  errors, but I expect that there are errors of grammar and possibly content that I did not discover before finalizing the note.

## 2021-03-04 ENCOUNTER — HOSPITAL ENCOUNTER (OUTPATIENT)
Dept: RADIOLOGY | Facility: MEDICAL CENTER | Age: 56
End: 2021-03-04
Attending: FAMILY MEDICINE
Payer: COMMERCIAL

## 2021-03-04 DIAGNOSIS — Z12.31 VISIT FOR SCREENING MAMMOGRAM: ICD-10-CM

## 2021-03-04 PROCEDURE — 77063 BREAST TOMOSYNTHESIS BI: CPT

## 2021-03-15 DIAGNOSIS — Z23 NEED FOR VACCINATION: ICD-10-CM

## 2021-04-21 ENCOUNTER — OFFICE VISIT (OUTPATIENT)
Dept: HEALTH INFORMATION MANAGEMENT | Facility: MEDICAL CENTER | Age: 56
End: 2021-04-21
Payer: COMMERCIAL

## 2021-04-21 DIAGNOSIS — E66.9 OBESITY (BMI 30-39.9): ICD-10-CM

## 2021-04-21 PROCEDURE — 97803 MED NUTRITION INDIV SUBSEQ: CPT | Performed by: DIETITIAN, REGISTERED

## 2021-04-21 NOTE — PROGRESS NOTES
Nutrition Reassess    4/21/2021    Delio Townsend M.D.   55 y.o.     Time In/Out: 3:15-3:45      Subjective:  - maintained weight loss  - accompanied by son, Troy  - less movement than usual  - plate method as a norm  - would like to stop medicine    Anthropometrics/Objective    There were no vitals filed for this visit.  There is no height or weight on file to calculate BMI.        ReAssesment/Notes:    Patt has been working on her overall health goals. She has been doing great with keeping her cholesterol levels and her BG levels in a healthful range. Applauded her for all of her sustained progress. We discussed some specific food questions regarding fats and sugars/ sugar alternatives. Discussed their overall health and well being. Patt would like to get out more but has found that there have been some safety barriers as of late. She will continue to work on this as she has a motivating goal of reducing her medication load! She has been showing great progress with her health goals so far. Encouraged Patt and Troy to reach out with any questions.    Follow-up: 6 months

## 2021-06-01 DIAGNOSIS — E11.9 TYPE 2 DIABETES MELLITUS WITHOUT COMPLICATION, WITHOUT LONG-TERM CURRENT USE OF INSULIN (HCC): ICD-10-CM

## 2021-06-01 DIAGNOSIS — E78.2 MIXED HYPERLIPIDEMIA: ICD-10-CM

## 2021-06-02 RX ORDER — ATORVASTATIN CALCIUM 20 MG/1
TABLET, FILM COATED ORAL
Qty: 90 TABLET | Refills: 0 | Status: SHIPPED | OUTPATIENT
Start: 2021-06-02 | End: 2021-08-24

## 2021-08-24 DIAGNOSIS — E11.9 TYPE 2 DIABETES MELLITUS WITHOUT COMPLICATION, WITHOUT LONG-TERM CURRENT USE OF INSULIN (HCC): ICD-10-CM

## 2021-08-24 DIAGNOSIS — E78.2 MIXED HYPERLIPIDEMIA: ICD-10-CM

## 2021-08-25 RX ORDER — ATORVASTATIN CALCIUM 20 MG/1
TABLET, FILM COATED ORAL
Qty: 90 TABLET | Refills: 3 | Status: SHIPPED | OUTPATIENT
Start: 2021-08-25

## 2021-11-20 DIAGNOSIS — E11.9 TYPE 2 DIABETES MELLITUS WITHOUT COMPLICATION, WITHOUT LONG-TERM CURRENT USE OF INSULIN (HCC): ICD-10-CM

## 2021-11-22 NOTE — TELEPHONE ENCOUNTER
Received request via: Pharmacy    Was the patient seen in the last year in this department? Yes  LOV: 2/12/2021  Does the patient have an active prescription (recently filled or refills available) for medication(s) requested? No

## 2022-07-29 ENCOUNTER — OFFICE VISIT (OUTPATIENT)
Dept: MEDICAL GROUP | Facility: LAB | Age: 57
End: 2022-07-29
Payer: COMMERCIAL

## 2022-07-29 VITALS
RESPIRATION RATE: 16 BRPM | OXYGEN SATURATION: 97 % | WEIGHT: 168 LBS | HEIGHT: 66 IN | SYSTOLIC BLOOD PRESSURE: 136 MMHG | BODY MASS INDEX: 27 KG/M2 | DIASTOLIC BLOOD PRESSURE: 94 MMHG | HEART RATE: 98 BPM | TEMPERATURE: 97.8 F

## 2022-07-29 DIAGNOSIS — E11.9 TYPE 2 DIABETES MELLITUS WITHOUT COMPLICATION, WITHOUT LONG-TERM CURRENT USE OF INSULIN (HCC): ICD-10-CM

## 2022-07-29 DIAGNOSIS — Z11.59 NEED FOR HEPATITIS C SCREENING TEST: ICD-10-CM

## 2022-07-29 DIAGNOSIS — I10 PRIMARY HYPERTENSION: ICD-10-CM

## 2022-07-29 DIAGNOSIS — Z23 NEED FOR VACCINATION: ICD-10-CM

## 2022-07-29 DIAGNOSIS — Z00.00 ANNUAL PHYSICAL EXAM: ICD-10-CM

## 2022-07-29 PROCEDURE — 90471 IMMUNIZATION ADMIN: CPT | Performed by: FAMILY MEDICINE

## 2022-07-29 PROCEDURE — 90670 PCV13 VACCINE IM: CPT | Performed by: FAMILY MEDICINE

## 2022-07-29 PROCEDURE — 99396 PREV VISIT EST AGE 40-64: CPT | Mod: 25 | Performed by: FAMILY MEDICINE

## 2022-07-29 RX ORDER — AMLODIPINE BESYLATE 5 MG/1
5 TABLET ORAL DAILY
Qty: 90 TABLET | Refills: 3 | Status: SHIPPED | OUTPATIENT
Start: 2022-07-29 | End: 2023-07-24

## 2022-07-29 ASSESSMENT — FIBROSIS 4 INDEX: FIB4 SCORE: 0.72

## 2022-07-29 ASSESSMENT — PATIENT HEALTH QUESTIONNAIRE - PHQ9: CLINICAL INTERPRETATION OF PHQ2 SCORE: 0

## 2022-07-29 NOTE — PROGRESS NOTES
Subjective:   Patt Elizondo is a 56 y.o. female here today for   Chief Complaint   Patient presents with   • Annual Exam     Check A1C     #Annual  -Reviewed all past medical history, family history, social history.  -Reviewed all screening/vaccinations: For pneumococcal vaccine.  -Diet and Exercise: Working on appropriate diet and exercise regiment for age and condition.  Has been working with nutritionist.  Has seen significant weight loss because of this.  -Tobacco, alcohol, recreational drug use: Rare alcohol use.  Denies any tobacco or recreational licit drug use.     #DM2  -Chronic longstanding condition was previously treated with metformin, atorvastatin; however, patient had discontinued all medications as she continues to work on diet and exercise has had an approximate 40 pound weight loss in the last year and a half.  Patient states that she is feeling well.  30 increased fatigue, polydipsia, polyuria, numbness/ting/pain in lower extremity.  -Is on statin some, however, discontinued taking.  -Due for labs today.  -Due for second pneumococcal today.    #hypertension:  -Patient has elevated blood pressure today 136/94.  Reviewing previous vitals patient has had a history of elevated blood pressure.  Denies any headaches, lightheadedness, chest pain, shortness of breath, changes in vision.  Not checking blood pressures regularly.  No previous diagnosis or treatment thereof.      No Known Allergies      Current medicines (including changes today)  Current Outpatient Medications   Medication Sig Dispense Refill   • metFORMIN (GLUCOPHAGE) 500 MG Tab TAKE 2 TABS BY MOUTH 2 TIMES A  Tablet 3   • atorvastatin (LIPITOR) 20 MG Tab TAKE 1 TABLET BY MOUTH EVERY DAY IN THE EVENING 90 Tablet 3   • Iron-Vitamins (GERITOL COMPLETE PO) Take 1 tablet by mouth every day.       No current facility-administered medications for this visit.     She  has a past medical history of Diabetes (HCC) and  "Hyperlipidemia.    She has no past medical history of Addisons disease (HCC), Adrenal disorder (HCC), Allergy, Anemia, Anxiety, Arrhythmia, Arthritis, Asthma, Blood transfusion without reported diagnosis, Cancer (HCC), Cataract, CHF (congestive heart failure) (HCC), Clotting disorder (HCC), COPD (chronic obstructive pulmonary disease) (HCC), Cushings syndrome (HCC), Depression, Diabetic neuropathy (HCC), GERD (gastroesophageal reflux disease), Glaucoma, Goiter, Head ache, Heart attack (HCC), Heart murmur, HIV (human immunodeficiency virus infection) (HCC), Hypertension, IBD (inflammatory bowel disease), Kidney disease, Meningitis, Migraine, Muscle disorder, Osteoporosis, Parathyroid disorder (HCC), Pituitary disease (HCC), Pulmonary emphysema (HCC), Seizure (HCC), Sickle cell disease (HCC), Stroke (HCC), Substance abuse (HCC), Thyroid disease, Tuberculosis, or Urinary tract infection.    ROS   -See HPI       Objective:     Physical Exam:  BP (!) 136/94   Pulse 98   Temp 36.6 °C (97.8 °F) (Temporal)   Resp 16   Ht 1.676 m (5' 6\")   Wt 76.2 kg (168 lb)   SpO2 97%  Body mass index is 27.12 kg/m².   Constitutional: Alert, no distress.  Skin: Warm, dry, good turgor, no rashes in visible areas.  Eye: Equal, round and reactive, conjunctiva clear, lids normal.  ENMT: TM's clear bilaterally, lips without lesions, good dentition, oropharynx clear.  Neck: Trachea midline, no masses, no thyromegaly. No cervical or supraclavicular lymphadenopathy.  Respiratory: Unlabored respiratory effort, lungs clear to auscultation, no wheezes, no rhonchi.  Cardiovascular: Normal S1, S2, no murmur, no edema.  Abdomen: Soft, non-tender, no masses, no hepatosplenomegaly.  Psych: Alert and oriented x3, normal affect and mood.    Assessment and Plan:     1. Annual physical exam  -All questions concerns were answered at this time.  -Vaccinations/screenings needed at this time: Pneumococcal vaccine needed today.  -Labs reviewed, no concerns, " recheck labs as below.  -Discussed the importance of a healthy, Mediterranean style diet, routine exercise regimen.  -Discussed general safety measures including seatbelts, helmets, avoidance of smoking, sunscreen, hydration.  -Follow-up for general physical exam on a yearly basis, sooner if needed.      2. Type 2 diabetes mellitus without complication, without long-term current use of insulin (HCC)  -Stable.  Patient continue to work on appropriate diet and exercise regimen.  We will discontinue metformin at this time.  Encourage statin use; however, patient deferred and will reconsider starting at a later date.  She is due for lab work which she will complete as below.  Recommend patient follow-up every 6 to 12 months for diabetes checkup.  - CBC WITHOUT DIFFERENTIAL; Future  - Comp Metabolic Panel; Future  - HEMOGLOBIN A1C; Future  - Lipid Profile; Future  - MICROALBUMIN CREAT RATIO URINE; Future  - Diabetic Monofilament LE Exam    3. Primary hypertension  -This is a new diagnosis based on vital signs today as well as blood pressures at previous visits.  We discussed continuation of appropriate diet and exercise, low-sodium diet, daily aerobic activity.  We will also start treatment with amlodipine.  Discussed possible side effects, importance of compliance with medication.  She will follow-up for recheck in 6 months.  - amLODIPine (NORVASC) 5 MG Tab; Take 1 Tablet by mouth every day for 360 days.  Dispense: 90 Tablet; Refill: 3    4. Need for hepatitis C screening test  - HEP C VIRUS ANTIBODY; Future    5. Need for vaccination  - Pneumococcal Conjugate Vaccine 13-Valent (6 mos-18 yrs)      Followup: No follow-ups on file.         PLEASE NOTE: This dictation was created using voice recognition software. I have made every reasonable attempt to correct obvious errors, but I expect that there are errors of grammar and possibly content that I did not discover before finalizing the note.

## 2022-07-30 ENCOUNTER — HOSPITAL ENCOUNTER (OUTPATIENT)
Dept: LAB | Facility: MEDICAL CENTER | Age: 57
End: 2022-07-30
Attending: FAMILY MEDICINE
Payer: COMMERCIAL

## 2022-07-30 DIAGNOSIS — E11.9 TYPE 2 DIABETES MELLITUS WITHOUT COMPLICATION, WITHOUT LONG-TERM CURRENT USE OF INSULIN (HCC): ICD-10-CM

## 2022-07-30 DIAGNOSIS — Z11.59 NEED FOR HEPATITIS C SCREENING TEST: ICD-10-CM

## 2022-07-30 LAB
ALBUMIN SERPL BCP-MCNC: 4.6 G/DL (ref 3.2–4.9)
ALBUMIN/GLOB SERPL: 1.4 G/DL
ALP SERPL-CCNC: 76 U/L (ref 30–99)
ALT SERPL-CCNC: 13 U/L (ref 2–50)
ANION GAP SERPL CALC-SCNC: 10 MMOL/L (ref 7–16)
AST SERPL-CCNC: 16 U/L (ref 12–45)
BILIRUB SERPL-MCNC: 0.3 MG/DL (ref 0.1–1.5)
BUN SERPL-MCNC: 13 MG/DL (ref 8–22)
CALCIUM SERPL-MCNC: 9.7 MG/DL (ref 8.5–10.5)
CHLORIDE SERPL-SCNC: 100 MMOL/L (ref 96–112)
CHOLEST SERPL-MCNC: 212 MG/DL (ref 100–199)
CO2 SERPL-SCNC: 27 MMOL/L (ref 20–33)
CREAT SERPL-MCNC: 0.52 MG/DL (ref 0.5–1.4)
CREAT UR-MCNC: 33.76 MG/DL
ERYTHROCYTE [DISTWIDTH] IN BLOOD BY AUTOMATED COUNT: 42.4 FL (ref 35.9–50)
EST. AVERAGE GLUCOSE BLD GHB EST-MCNC: 117 MG/DL
FASTING STATUS PATIENT QL REPORTED: NORMAL
GFR SERPLBLD CREATININE-BSD FMLA CKD-EPI: 109 ML/MIN/1.73 M 2
GLOBULIN SER CALC-MCNC: 3.2 G/DL (ref 1.9–3.5)
GLUCOSE SERPL-MCNC: 96 MG/DL (ref 65–99)
HBA1C MFR BLD: 5.7 % (ref 4–5.6)
HCT VFR BLD AUTO: 42.1 % (ref 37–47)
HCV AB SER QL: NORMAL
HDLC SERPL-MCNC: 47 MG/DL
HGB BLD-MCNC: 14.8 G/DL (ref 12–16)
LDLC SERPL CALC-MCNC: 149 MG/DL
MCH RBC QN AUTO: 28 PG (ref 27–33)
MCHC RBC AUTO-ENTMCNC: 35.2 G/DL (ref 33.6–35)
MCV RBC AUTO: 79.6 FL (ref 81.4–97.8)
MICROALBUMIN UR-MCNC: <1.2 MG/DL
MICROALBUMIN/CREAT UR: NORMAL MG/G (ref 0–30)
PLATELET # BLD AUTO: 323 K/UL (ref 164–446)
PMV BLD AUTO: 10.4 FL (ref 9–12.9)
POTASSIUM SERPL-SCNC: 4 MMOL/L (ref 3.6–5.5)
PROT SERPL-MCNC: 7.8 G/DL (ref 6–8.2)
RBC # BLD AUTO: 5.29 M/UL (ref 4.2–5.4)
SODIUM SERPL-SCNC: 137 MMOL/L (ref 135–145)
TRIGL SERPL-MCNC: 79 MG/DL (ref 0–149)
WBC # BLD AUTO: 6.5 K/UL (ref 4.8–10.8)

## 2022-07-30 PROCEDURE — 82570 ASSAY OF URINE CREATININE: CPT

## 2022-07-30 PROCEDURE — 85027 COMPLETE CBC AUTOMATED: CPT

## 2022-07-30 PROCEDURE — 36415 COLL VENOUS BLD VENIPUNCTURE: CPT

## 2022-07-30 PROCEDURE — 86803 HEPATITIS C AB TEST: CPT

## 2022-07-30 PROCEDURE — 83036 HEMOGLOBIN GLYCOSYLATED A1C: CPT

## 2022-07-30 PROCEDURE — 80061 LIPID PANEL: CPT

## 2022-07-30 PROCEDURE — 80053 COMPREHEN METABOLIC PANEL: CPT

## 2022-07-30 PROCEDURE — 82043 UR ALBUMIN QUANTITATIVE: CPT

## 2022-08-02 ENCOUNTER — HOSPITAL ENCOUNTER (OUTPATIENT)
Dept: RADIOLOGY | Facility: MEDICAL CENTER | Age: 57
End: 2022-08-02
Attending: FAMILY MEDICINE
Payer: COMMERCIAL

## 2022-08-02 DIAGNOSIS — Z12.31 VISIT FOR SCREENING MAMMOGRAM: ICD-10-CM

## 2022-08-02 PROCEDURE — 77063 BREAST TOMOSYNTHESIS BI: CPT
